# Patient Record
Sex: MALE | Race: WHITE
[De-identification: names, ages, dates, MRNs, and addresses within clinical notes are randomized per-mention and may not be internally consistent; named-entity substitution may affect disease eponyms.]

---

## 2020-03-02 ENCOUNTER — HOSPITAL ENCOUNTER (EMERGENCY)
Dept: HOSPITAL 25 - UCCORT | Age: 25
Discharge: HOME | End: 2020-03-02
Payer: MEDICAID

## 2020-03-02 VITALS — DIASTOLIC BLOOD PRESSURE: 70 MMHG | SYSTOLIC BLOOD PRESSURE: 133 MMHG

## 2020-03-02 DIAGNOSIS — J20.9: Primary | ICD-10-CM

## 2020-03-02 DIAGNOSIS — R09.81: ICD-10-CM

## 2020-03-02 DIAGNOSIS — Z88.8: ICD-10-CM

## 2020-03-02 DIAGNOSIS — F17.290: ICD-10-CM

## 2020-03-02 DIAGNOSIS — R03.0: ICD-10-CM

## 2020-03-02 DIAGNOSIS — R09.89: ICD-10-CM

## 2020-03-02 PROCEDURE — G0463 HOSPITAL OUTPT CLINIC VISIT: HCPCS

## 2020-03-02 PROCEDURE — 81003 URINALYSIS AUTO W/O SCOPE: CPT

## 2020-03-02 PROCEDURE — 99203 OFFICE O/P NEW LOW 30 MIN: CPT

## 2020-03-02 NOTE — UC
Respiratory Complaint HPI





- HPI Summary


HPI Summary: 





23 yo make with 2 week hx of cough/sinus pressure and pain/chest tightness/

sputum production





no n/v/d





works outstide doing constuction





? feverish at times











- History of Current Complaint


Chief Complaint: UCRespiratory


Stated Complaint: CONGESTION, COUGH


Time Seen by Provider: 03/02/20 20:05


Hx Obtained From: Patient


Onset/Duration: Gradual Onset, Lasting Weeks


Timing: Constant


Severity Initially: Mild


Severity Currently: Moderate


Pain Intensity: 4


Pain Scale Used: 0-10 Numeric


Character: Cough: Productive


Aggravating Factors: Nothing


Alleviating Factors: Nothing


Associated Signs And Symptoms: Positive: Wheezing, Nasal Congestion, Hoarseness

, Sinus Discomfort





- Allergies/Home Medications


Allergies/Adverse Reactions: 


 Allergies











Allergy/AdvReac Type Severity Reaction Status Date / Time


 


selenium sulfide Allergy  Rash Verified 03/02/20 19:57





[From Godfrey Bhandari]     











Home Medications: 


 Home Medications





Acetaminophen [Acetaminophen Extra Strength] 500 mg PO Q4H PRN 03/02/20 [

History Confirmed 03/02/20]


Albuterol 2.5MG/3ML (0.083%)* [Ventolin 2.5 MG/3 ML NEB.SOL*] 2.5 mg INH QID 

PRN #1 neb.sol 03/02/20 [Rx]


Amoxicillin PO (*) [Amoxicillin 875 MG (*)] 875 mg PO BID #14 tab 03/02/20 [Rx]


Doxylam/PE/Dm/Acetaminophen/GG [Mucinex Sinus-Max Day/Nig] 2 cap PO Q12H PRN 03/ 02/20 [History Confirmed 03/02/20]


Ibuprofen TAB* [Motrin TAB* 600 MG] 600 mg PO Q4H PRN 03/02/20 [History 

Confirmed 03/02/20]


Melatonin/Pyridoxine HCl (B6) [Melatonin 3 mg Tablet] 9 mg PO ONCE PRN 03/02/20 

[History Confirmed 03/02/20]


predniSONE 20 mg TAB [Deltasone 20 MG TAB*] 40 mg PO DAILY #8 tab 03/02/20 [Rx]











PMH/Surg Hx/FS Hx/Imm Hx


Previously Healthy: Yes


Respiratory History: Pneumonia





- Surgical History


Surgical History: None





- Family History


Known Family History: Positive: Hypertension





- Social History


Alcohol Use: None


Substance Use Type: Excessive Caffeine


Substance Use Comment - Amount & Last Used: 2 liters Mountain Dew daily


Smoking Status (MU): Heavy Every Day Tobacco Smoker


Cessation Counseling: Patient Advised to Stop





Review of Systems


All Other Systems Reviewed And Are Negative: Yes


Constitutional: Positive: Fatigue


Skin: Positive: Negative


Eyes: Positive: Negative


ENT: Positive: Nasal Discharge, Sinus Congestion


Respiratory: Positive: Cough


Cardiovascular: Positive: Negative


Gastrointestinal: Positive: Negative


Genitourinary: Positive: Negative


Motor: Positive: Negative


Neurovascular: Positive: Negative


Musculoskeletal: Positive: Negative


Neurological/Mental Status: Positive: Negative


Psychological: Positive: Negative





Physical Exam


Triage Information Reviewed: Yes


Appearance: Well-Appearing, No Pain Distress, Well-Nourished


Vital Signs: 


 Initial Vital Signs











Temp  98.6 F   03/02/20 19:46


 


Pulse  70   03/02/20 19:46


 


Resp  18   03/02/20 19:46


 


BP  133/70   03/02/20 19:46


 


Pulse Ox  100   03/02/20 19:46











Vital Signs Reviewed: Yes


Eyes: Positive: Conjunctiva Clear


ENT: Positive: Hearing grossly normal, Nasal congestion, Sinus tenderness, 

Uvula midline.  Negative: Nasal drainage, TMs normal, Tonsillar swelling, 

Tonsillar exudate, Trismus, Muffled voice, Hoarse voice, Dental tenderness


Dental Exam: Normal


Neck: Positive: Supple, Nontender, No Lymphadenopathy


Respiratory: Positive: No respiratory distress, No accessory muscle use, 

Wheezing - with forced expiration


Cardiovascular: Positive: RRR, No Murmur, Pulses Normal


Abdomen Description: Positive: Nontender, No Organomegaly.  Negative: CVA 

Tenderness (R), CVA Tenderness (L)


Musculoskeletal: Positive: ROM Intact, No Edema


Neurological: Positive: Alert


Psychological Exam: Normal


Skin Exam: Normal





Respiratory Course/Dx





- Differential Dx/Diagnosis


Provider Diagnosis: 


 Acute bronchitis with bronchospasm, Smoker, Elevated BP without diagnosis of 

hypertension








Discharge ED





- Sign-Out/Discharge


Documenting (check all that apply): Patient Departure


All imaging exams completed and their final reports reviewed: No Studies





- Discharge Plan


Condition: Stable


Disposition: HOME


Prescriptions: 


Albuterol 2.5MG/3ML (0.083%)* [Ventolin 2.5 MG/3 ML NEB.SOL*] 2.5 mg INH QID 

PRN #1 neb.sol


 PRN Reason: Wheezing


Amoxicillin PO (*) [Amoxicillin 875 MG (*)] 875 mg PO BID #14 tab


predniSONE 20 mg TAB [Deltasone 20 MG TAB*] 40 mg PO DAILY #8 tab


Patient Education Materials:  Acute Bronchitis (ED)


Forms:  *Work Release


Referrals: 


No Primary Care Phys,NOPCP [Primary Care Provider] - 


Additional Instructions: 


you need to decrease or stop smoking





you need to find a primary care provider (your BP is a little high here and 

should be followed in  2-20 weeks





I suggest you get rechecked in 4-5 days if not markedly improved

















- Billing Disposition and Condition


Condition: STABLE


Disposition: Home